# Patient Record
(demographics unavailable — no encounter records)

---

## 2025-05-06 NOTE — ASSESSMENT
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 Pt already doing PT, and has gotten GC injections. Will refer pt to ortho for surgical options if PT not effective.   Pt concerned about dry mouth, so will send AI serologies. Low suspicion though, given lack of systemic symptoms.   Plan -Labs w serologies, inflammatory markers -ortho referral  RTO depending on above results

## 2025-05-06 NOTE — PHYSICAL EXAM
[General Appearance - In No Acute Distress] : in no acute distress [Sclera] : the sclera and conjunctiva were normal [Auscultation Breath Sounds / Voice Sounds] : lungs were clear to auscultation bilaterally [Heart Rate And Rhythm] : heart rate was normal and rhythm regular [Heart Sounds] : normal S1 and S2 [Heart Sounds Gallop] : no gallops [Abdomen Soft] : soft [Abdomen Tenderness] : non-tender [] : no hepato-splenomegaly [Cervical Lymph Nodes Enlarged Posterior Bilaterally] : posterior cervical [Cervical Lymph Nodes Enlarged Anterior Bilaterally] : anterior cervical [Supraclavicular Lymph Nodes Enlarged Bilaterally] : supraclavicular [FreeTextEntry1] : R shoulder positive for Kneers, empty can sign, hawins

## 2025-05-06 NOTE — DATA REVIEWED
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

## 2025-05-06 NOTE — HISTORY OF PRESENT ILLNESS
[FreeTextEntry1] : 43 y/o F here for initial visit  Pt reports R shoulder pain since 2016. Pt says worse activities.  Pt takes meloxicam, which helps pt. but pt has gastritis, esophagitis.  Pt just started PT.  Pt reports stiffness, swelling.   Pt has carpal tunnel which she had surgery   No fevers, h/a, rashes, hair loss, oral ulcers, epistaxis, sinusitis,  swollen glands,  dry eyes, CP, SOB, cough, vision changes, abdominal pain, GERD, n/v/d, blood in stool or urine, focal weakness, sensory loss,  Raynaud's, weight loss.  +dry mouth  +has allergies  +migraines-has pituitary tumor  +sometimes her "shins feel hot"